# Patient Record
(demographics unavailable — no encounter records)

---

## 2024-12-02 NOTE — ASSESSMENT
[FreeTextEntry1] : We are obtaining the results of the recent MRI showing a lesion in the right breast and also the report for evaluation with our radiology staff.  The patient is also undergoing surgical evaluation with her surgeon Dr. Manley in 1 week.  Most likely the patient will undergo biopsy to assess the nature of the lesion and then she will return to discuss whether or not there is any indication for treatment depending on the pathology results.  He will return in 1 month or sooner as needed. [Curative] : Goals of care discussed with patient: Curative

## 2024-12-02 NOTE — PHYSICAL EXAM
[Restricted in physically strenuous activity but ambulatory and able to carry out work of a light or sedentary nature] : Status 1- Restricted in physically strenuous activity but ambulatory and able to carry out work of a light or sedentary nature, e.g., light house work, office work [Obese] : obese [Normal] : affect appropriate [de-identified] : no massesin breasts

## 2024-12-02 NOTE — PHYSICAL EXAM
[Restricted in physically strenuous activity but ambulatory and able to carry out work of a light or sedentary nature] : Status 1- Restricted in physically strenuous activity but ambulatory and able to carry out work of a light or sedentary nature, e.g., light house work, office work [Obese] : obese [Normal] : affect appropriate [de-identified] : no massesin breasts

## 2024-12-02 NOTE — HISTORY OF PRESENT ILLNESS
[de-identified] : This is a 74-year-old woman with history of carcinoma of breast returns for followup. The patient is doing well with no evidence of recurrent disease. She has been on Arimidex treatment and will be completing 4 years of therapy. In 2015 Pt had left lumpectomy 10 yrs ago followed by RT and Arimidex for 5 yrs. Pt has had no recurrence of her disease,  Pt had mammo 9/22 and has lesion in right breast and bx shows papillary lesion. Pt saw Dr. Manley and will have lumpectomy. [de-identified] : Pt has repeat  mammo showing lesion in breast right breast.  Currently report is not available and we will request to obtain disc and report for further evaluation.  The patient is also scheduled to see her breast surgeon Dr. Virk in 1 week for evaluation.  The patient did have a recurrence in the right breast 2 years ago with has decided not to take antihormone therapy with anastrozole and she is being monitored.  i

## 2024-12-02 NOTE — HISTORY OF PRESENT ILLNESS
[de-identified] : This is a 74-year-old woman with history of carcinoma of breast returns for followup. The patient is doing well with no evidence of recurrent disease. She has been on Arimidex treatment and will be completing 4 years of therapy. In 2015 Pt had left lumpectomy 10 yrs ago followed by RT and Arimidex for 5 yrs. Pt has had no recurrence of her disease,  Pt had mammo 9/22 and has lesion in right breast and bx shows papillary lesion. Pt saw Dr. Manley and will have lumpectomy. [de-identified] : Pt has repeat  mammo showing lesion in breast right breast.  Currently report is not available and we will request to obtain disc and report for further evaluation.  The patient is also scheduled to see her breast surgeon Dr. Virk in 1 week for evaluation.  The patient did have a recurrence in the right breast 2 years ago with has decided not to take antihormone therapy with anastrozole and she is being monitored.  i

## 2024-12-02 NOTE — REVIEW OF SYSTEMS
[Fatigue] : fatigue [Diarrhea: Grade 0] : Diarrhea: Grade 0 [Joint Pain] : joint pain [Joint Stiffness] : joint stiffness [Negative] : Allergic/Immunologic

## 2024-12-10 NOTE — HISTORY OF PRESENT ILLNESS
[de-identified] : Ms. Flor Gold is a 84 year old female who presents for a follow up visit.  Personal history of left breast lumpectomy in 2015- received RT and completed anastrozole  Family history of breast cancer in her mother at the age of 36 80??  US guided biopsy on 11/3/22 shows atypical papillary lesion BI-RADS 4  She is s/p right breast lumpectomy on 12/16/22. 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 Negative margins.  Continues to follow with Dr. Raud Smith, not on endocrine therapy at this time.   Her most recent imaging a screening MMG/US on 11/4/2024 shows right breast 9:00 irregular 0.5 cm sonographic mass, corresponding to irregular 0.8 cm mass. R USGB recommended. Left upper outer quadrant, there is nonspecific area of increased trabecular thickening, which has appeared new, L DM/US. Right upper quadrant corresponds to scar site, there is MMG focal asymmetry which appears more prominent, suggestive for postsurgical change/fat necrosis. 6-month f/u of right breast MMG/US is recommended. B4  Recall for left MMG/US on 12/9/2024 shows nonspecific trabecular thickening in the left upper outer breast which represents interval change from previous mammos. Skin thickening in the left 3:00 on US with a peu d' orange appearance noted on exam, may correspond to the skin overlying the region of mammographic concern. Possible skin biopsy is recommended. There is also an 8 mm dermal lesion in left breast 3:00 N15 which correlates to a sebaceous cyst or other dermal lesion.  B4  She is scheduled for right breast biopsy on 12/10/2024

## 2024-12-10 NOTE — ASSESSMENT
[FreeTextEntry1] : IMP: Flor is a 81 y/o female who has a history of left breast cancer.  New ~2 cm palpable finding of left breast US guided biopsy 11/2022 showed atypical palpable lesion  s/p right breast lumpectomy on 12/16/22. 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 Negative margins.  Her recent imaging recommended a right breast US (performed today 12/10/24) and a punch biopsy of left breast skin thickening with a similar appearance to peau d' orange to r/o inflammatory carcinoma.  At this time, I am not inclined to perform a punch biopsy of the left breast at 3:00. She will return in 4 months.   PLAN: Awaiting results of right breast biopsy, will speak to patient after resulted Punch biopsy not performed, return in 4 months   All medical entries were at my, Dr. Alon Manley, direction. I have reviewed the chart and agree that the record accurately reflects my personal performance of the history, physical exam, assessment and plan. Our office Nurse Practitioner was present of the duration of the office visit.

## 2024-12-10 NOTE — HISTORY OF PRESENT ILLNESS
[de-identified] : Ms. Flor Gold is a 84 year old female who presents for a follow up visit.  Personal history of left breast lumpectomy in 2015- received RT and completed anastrozole  Family history of breast cancer in her mother at the age of 36 80??  US guided biopsy on 11/3/22 shows atypical papillary lesion BI-RADS 4  She is s/p right breast lumpectomy on 12/16/22. 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 Negative margins.  Continues to follow with Dr. Radu Smith, not on endocrine therapy at this time.   Her most recent imaging a screening MMG/US on 11/4/2024 shows right breast 9:00 irregular 0.5 cm sonographic mass, corresponding to irregular 0.8 cm mass. R USGB recommended. Left upper outer quadrant, there is nonspecific area of increased trabecular thickening, which has appeared new, L DM/US. Right upper quadrant corresponds to scar site, there is MMG focal asymmetry which appears more prominent, suggestive for postsurgical change/fat necrosis. 6-month f/u of right breast MMG/US is recommended. B4  Recall for left MMG/US on 12/9/2024 shows nonspecific trabecular thickening in the left upper outer breast which represents interval change from previous mammos. Skin thickening in the left 3:00 on US with a peu d' orange appearance noted on exam, may correspond to the skin overlying the region of mammographic concern. Possible skin biopsy is recommended. There is also an 8 mm dermal lesion in left breast 3:00 N15 which correlates to a sebaceous cyst or other dermal lesion.  B4  She is scheduled for right breast biopsy on 12/10/2024

## 2024-12-10 NOTE — PHYSICAL EXAM
[Normal] : supple, no neck mass and thyroid not enlarged [Normal Neck Lymph Nodes] : normal neck lymph nodes  [Normal Supraclavicular Lymph Nodes] : normal supraclavicular lymph nodes [Normal Axillary Lymph Nodes] : normal axillary lymph nodes [Normal] : oriented to person, place and time, with appropriate affect [FreeTextEntry1] : SC present for exam  [de-identified] : Normal S1,S2. Regular rate and rhythm [de-identified] : Slight redness noted on left breast at 3:00. Right breast normal breast exam [de-identified] : Clear breath sounds bilaterally with normal respiratory effort.

## 2024-12-10 NOTE — REASON FOR VISIT
[Follow-Up Visit] : a follow-up visit for [FreeTextEntry2] : s/p right breast lumpectomy on 12/16/22

## 2024-12-10 NOTE — PHYSICAL EXAM
[Normal] : supple, no neck mass and thyroid not enlarged [Normal Neck Lymph Nodes] : normal neck lymph nodes  [Normal Supraclavicular Lymph Nodes] : normal supraclavicular lymph nodes [Normal Axillary Lymph Nodes] : normal axillary lymph nodes [Normal] : oriented to person, place and time, with appropriate affect [FreeTextEntry1] : SC present for exam  [de-identified] : Normal S1,S2. Regular rate and rhythm [de-identified] : Slight redness noted on left breast at 3:00. Right breast normal breast exam [de-identified] : Clear breath sounds bilaterally with normal respiratory effort.

## 2025-01-07 NOTE — HISTORY OF PRESENT ILLNESS
[de-identified] : Ms. Flor Gold is a 84 year old female who presents for a follow up visit.  Personal history of left breast lumpectomy in 2015- received RT and completed anastrozole  Family history of breast cancer in her mother at the age of 36 80  US guided biopsy on 11/3/22 shows atypical papillary lesion BI-RADS 4  She is s/p right breast lumpectomy on 12/16/22. 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 Negative margins.  Continues to follow with Dr. Radu Smith, not on endocrine therapy at this time.   Her most recent imaging a screening MMG/US on 11/4/2024 shows right breast 9:00 irregular 0.5 cm sonographic mass, corresponding to irregular 0.8 cm mass. R USGB recommended. Left upper outer quadrant, there is nonspecific area of increased trabecular thickening, which has appeared new, L DM/US. Right upper quadrant corresponds to scar site, there is MMG focal asymmetry which appears more prominent, suggestive for postsurgical change/fat necrosis. 6-month f/u of right breast MMG/US is recommended. B4  Recall for left MMG/US on 12/9/2024 shows nonspecific trabecular thickening in the left upper outer breast which represents interval change from previous mammos. Skin thickening in the left 3:00 on US with a peu d' orange appearance noted on exam, may correspond to the skin overlying the region of mammographic concern. Possible skin biopsy is recommended. There is also an 8 mm dermal lesion in left breast 3:00 N15 which correlates to a sebaceous cyst or other dermal lesion.  B4  R 9:00 N3 USGB on 12/10/2024: Invasive mammary carcinoma with ductal and lobular features, ER/[place]/HER2+

## 2025-01-07 NOTE — PHYSICAL EXAM
[Normal] : supple, no neck mass and thyroid not enlarged [Normal Neck Lymph Nodes] : normal neck lymph nodes  [Normal Supraclavicular Lymph Nodes] : normal supraclavicular lymph nodes [Normal Axillary Lymph Nodes] : normal axillary lymph nodes [Normal] : oriented to person, place and time, with appropriate affect [FreeTextEntry1] : SC present for exam  [de-identified] : Normal S1,S2. Regular rate and rhythm [de-identified] : Complete breast exam performed in supine and upright position. No palpable masses, tenderness, nipple discharge, inversion, deviation or enlarged axillary or supraclavicular lymph nodes bilaterally. [de-identified] : Clear breath sounds bilaterally with normal respiratory effort.

## 2025-01-07 NOTE — ASSESSMENT
[FreeTextEntry1] : IMP: Flor is a 81 y/o female who has a history of left breast cancer.  New ~2 cm palpable finding of left breast US guided biopsy 11/2022 showed atypical palpable lesion  s/p right breast lumpectomy on 12/16/22. 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 Negative margins.  Her recent imaging recommended a right breast US (performed today 12/10/24) and a punch biopsy of left breast skin thickening with a similar appearance to peau d' orange to r/o inflammatory carcinoma.  At this time, I am not inclined to perform a punch biopsy of the left breast at 3:00. She will return in 4 months.  R 9:00 N3 USGB on 12/10/2024: Invasive mammary carcinoma with ductal and lobular features, ER/[place]/HER2+  PLAN: Will discuss case with Dr. Smith and then finalize surgical plan   All medical entries were at my, Dr. Alon Manley, direction. I have reviewed the chart and agree that the record accurately reflects my personal performance of the history, physical exam, assessment and plan. Our office Nurse Practitioner was present of the duration of the office visit.

## 2025-01-07 NOTE — PHYSICAL EXAM
[Normal] : supple, no neck mass and thyroid not enlarged [Normal Neck Lymph Nodes] : normal neck lymph nodes  [Normal Supraclavicular Lymph Nodes] : normal supraclavicular lymph nodes [Normal Axillary Lymph Nodes] : normal axillary lymph nodes [Normal] : oriented to person, place and time, with appropriate affect [FreeTextEntry1] : SC present for exam  [de-identified] : Normal S1,S2. Regular rate and rhythm [de-identified] : Complete breast exam performed in supine and upright position. No palpable masses, tenderness, nipple discharge, inversion, deviation or enlarged axillary or supraclavicular lymph nodes bilaterally. [de-identified] : Clear breath sounds bilaterally with normal respiratory effort.

## 2025-01-07 NOTE — ASSESSMENT
[FreeTextEntry1] : IMP: Flor is a 81 y/o female who has a history of left breast cancer.  New ~2 cm palpable finding of left breast US guided biopsy 11/2022 showed atypical palpable lesion  s/p right breast lumpectomy on 12/16/22. 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 Negative margins.  Her recent imaging recommended a right breast US (performed today 12/10/24) and a punch biopsy of left breast skin thickening with a similar appearance to peau d' orange to r/o inflammatory carcinoma.  At this time, I am not inclined to perform a punch biopsy of the left breast at 3:00. She will return in 4 months.  R 9:00 N3 USGB on 12/10/2024: Invasive mammary carcinoma with ductal and lobular features, ER/[place]/HER2+  PLAN: Will discuss case with Dr. Smith and then finalize surgical plan   All medical entries were at my, Dr. Alon Manley, direction. I have reviewed the chart and agree that the record accurately reflects my personal performance of the history, physical exam, assessment and plan. Our office Nurse Practitioner was present of the duration of the office visit.

## 2025-01-07 NOTE — HISTORY OF PRESENT ILLNESS
[de-identified] : Ms. Flor Gold is a 84 year old female who presents for a follow up visit.  Personal history of left breast lumpectomy in 2015- received RT and completed anastrozole  Family history of breast cancer in her mother at the age of 36 80  US guided biopsy on 11/3/22 shows atypical papillary lesion BI-RADS 4  She is s/p right breast lumpectomy on 12/16/22. 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 Negative margins.  Continues to follow with Dr. Radu Smith, not on endocrine therapy at this time.   Her most recent imaging a screening MMG/US on 11/4/2024 shows right breast 9:00 irregular 0.5 cm sonographic mass, corresponding to irregular 0.8 cm mass. R USGB recommended. Left upper outer quadrant, there is nonspecific area of increased trabecular thickening, which has appeared new, L DM/US. Right upper quadrant corresponds to scar site, there is MMG focal asymmetry which appears more prominent, suggestive for postsurgical change/fat necrosis. 6-month f/u of right breast MMG/US is recommended. B4  Recall for left MMG/US on 12/9/2024 shows nonspecific trabecular thickening in the left upper outer breast which represents interval change from previous mammos. Skin thickening in the left 3:00 on US with a peu d' orange appearance noted on exam, may correspond to the skin overlying the region of mammographic concern. Possible skin biopsy is recommended. There is also an 8 mm dermal lesion in left breast 3:00 N15 which correlates to a sebaceous cyst or other dermal lesion.  B4  R 9:00 N3 USGB on 12/10/2024: Invasive mammary carcinoma with ductal and lobular features, ER/DC/HER2+

## 2025-01-22 NOTE — RESULTS/DATA
[] : results reviewed [de-identified] : Afib, 47 bpm, left anterior fascicular block, poor R-wave progression, nonspecific T-abnormality

## 2025-01-22 NOTE — DISCUSSION/SUMMARY
[FreeTextEntry1] : The patient is an 84-year-old female [place], DM, HTN, HLD with slow a-fib otherwise asymptomatic.  #1 CV- normal LV function #2 A-fib- hold Xarelto until after surgery, event monitor after surgery, discussed possible need for PPM #3 HTN- not on medication #4 HLD- c/w rosuvastatin 10mg #5 DM- c/w metformin 850 mg bid #6 Thyroid- c/w levothyroxine 75mcg, therapeutic #7 Anxiety- c/w duloxetine 60mg #8 - on Detrol LA and Oxybutynin 5mg., Son supportive.

## 2025-01-22 NOTE — RESULTS/DATA
[] : results reviewed [de-identified] : Afib, 47 bpm, left anterior fascicular block, poor R-wave progression, nonspecific T-abnormality

## 2025-01-22 NOTE — PLAN
[FreeTextEntry1] : 84 years old female medically stable for planned procedure  EKG: Afib, 47 bpm, left anterior fascicular block, poor R-wave progression, nonspecific T-abnormality referred to cardiology for medical clearance  Afib on Xarelto 20mg daily/ instructed to hold Xarelto 2-3 days prior to procedure  b/l LE edema , chronic  Venous doppler negative for DVT  hx of hypothyroidism cont Levothyroxine 75mcg daily  DM II Hemoglobin A1c 7.2 on  Metformin 850mg BID /hold metformin on the day of the procedure  HLD cont Rosuvastatin 10mg QHS  hx of depression cont Duloxetine 60mg QD  hx of overactive bladder cont Detrol 4mg QD  Labs reviewed: Elevated PTT/INR, likely from Xarelto Patient has mild anemia, stable  Patient was evaluated and cleared by cardiology.  See cardiology clearance as well.

## 2025-01-22 NOTE — HISTORY OF PRESENT ILLNESS
[Atrial Fibrillation] : atrial fibrillation [Chronic Anticoagulation] : chronic anticoagulation [Diabetes] : diabetes [(Patient denies any chest pain, claudication, dyspnea on exertion, orthopnea, palpitations or syncope)] : Patient denies any chest pain, claudication, dyspnea on exertion, orthopnea, palpitations or syncope [Coronary Artery Disease] : no coronary artery disease [Recent Myocardial Infarction] : no recent myocardial infarction [Implantable Device/Pacemaker] : no implantable device/pacemaker [Asthma] : no asthma [COPD] : no COPD [Sleep Apnea] : no sleep apnea [Smoker] : not a smoker [Family Member] : no family member with adverse anesthesia reaction/sudden death [Self] : no previous adverse anesthesia reaction [Chronic Kidney Disease] : no chronic kidney disease [FreeTextEntry1] : Right breast, lumpectomy [FreeTextEntry2] : 01/24/2025 [FreeTextEntry3] : Dr. Sindhu Chi [FreeTextEntry4] : Flor Mendez is an 84-year-old female, with hx. of A.fib, DMII, HLD, overactive bladder and hypothyroidism, Breast cancer  who presents for medical clearance prior to lumpectomy . She is accompanied by her son.   She was formerly a patient of Dr. Dunaway, whose practice recently closed.  Pt.'s son states pt has chronic b/l LE swelling/ redness . Son states swelling varies throughout the day. Last US doppler was 2 years ago, with vascular. She does not monitor glucose at home She denies CP/SOB, dizziness

## 2025-01-22 NOTE — ADDENDUM
[FreeTextEntry1] : Documented by Merly Srivastava acting as a scribe for Dr. Hood. 01/17/2025   All medical record entries made by the scribe were at my, Dr. Hood, direction and personally dictated by me on 01/17/2025. I have reviewed the chart and agree that the record accurately reflects my personal performance of the history, physical exam, assessment and plan. I have also personally directed, reviewed, and agreed with the chart.

## 2025-01-22 NOTE — PHYSICAL EXAM
[No Acute Distress] : no acute distress [Normal Sclera/Conjunctiva] : normal sclera/conjunctiva [Normal Outer Ear/Nose] : the outer ears and nose were normal in appearance [No JVD] : no jugular venous distention [No Lymphadenopathy] : no lymphadenopathy [Supple] : supple [No Respiratory Distress] : no respiratory distress  [No Accessory Muscle Use] : no accessory muscle use [Clear to Auscultation] : lungs were clear to auscultation bilaterally [Normal Rate] : normal rate  [Regular Rhythm] : with a regular rhythm [Normal S1, S2] : normal S1 and S2 [No Murmur] : no murmur heard [Pedal Pulses Present] : the pedal pulses are present [No Extremity Clubbing/Cyanosis] : no extremity clubbing/cyanosis [Soft] : abdomen soft [Non Tender] : non-tender [Non-distended] : non-distended [Normal Posterior Cervical Nodes] : no posterior cervical lymphadenopathy [Normal Anterior Cervical Nodes] : no anterior cervical lymphadenopathy [No CVA Tenderness] : no CVA  tenderness [No Spinal Tenderness] : no spinal tenderness [No Joint Swelling] : no joint swelling [No Rash] : no rash [No Focal Deficits] : no focal deficits [Normal Affect] : the affect was normal [Normal Insight/Judgement] : insight and judgment were intact [PERRL] : pupils equal round and reactive to light [EOMI] : extraocular movements intact [de-identified] : + b/l LE edema, left > rt

## 2025-01-22 NOTE — HISTORY OF PRESENT ILLNESS
[FreeTextEntry1] : Flor is an 84-year-old female DM, HLD, A-fib, hypothyroid with LE edema. She was seeing [facility] Noyola until [facility]. She has known about slow afib and been asymptomatic. Two prior lumpectomy procedures without incident. Here with her son as patient is [place].   Surgery: Right breast lumpectomy Date: 1/24/25 Surgeon: Dr. Sindhu Chi

## 2025-01-22 NOTE — HISTORY OF PRESENT ILLNESS
[FreeTextEntry1] : Flor is an 84-year-old female DM, HLD, A-fib, hypothyroid with LE edema. She was seeing [facility] Noyola until [facility]. She has known about slow afib and been asymptomatic. Two prior lumpectomy procedures without incident. Here with her son as patient is [place].   Surgery: Right breast lumpectomy Date: 1/24/25 Surgeon: Dr. Sindhu Chi

## 2025-01-22 NOTE — DISCUSSION/SUMMARY
[FreeTextEntry1] : The patient is an 84-year-old female [place], DM, HTN, HLD with slow a-fib otherwise asymptomatic.  #1 CV- normal LV function #2 A-fib- hold Xarelto until after surgery, event monitor after surgery, discussed possible need for PPM #3 HTN- not on medication #4 HLD- c/w rosuvastatin 10mg #5 DM- c/w metformin 850 mg bid #6 Thyroid- c/w levothyroxine 75mcg, therapeutic #7 Anxiety- c/w duloxetine 60mg #8 - on Detrol LA and Oxybutynin 5mg., Son supportive.

## 2025-01-22 NOTE — PHYSICAL EXAM
[No Acute Distress] : no acute distress [Normal Sclera/Conjunctiva] : normal sclera/conjunctiva [Normal Outer Ear/Nose] : the outer ears and nose were normal in appearance [No JVD] : no jugular venous distention [No Lymphadenopathy] : no lymphadenopathy [Supple] : supple [No Respiratory Distress] : no respiratory distress  [No Accessory Muscle Use] : no accessory muscle use [Clear to Auscultation] : lungs were clear to auscultation bilaterally [Normal Rate] : normal rate  [Regular Rhythm] : with a regular rhythm [Normal S1, S2] : normal S1 and S2 [No Murmur] : no murmur heard [Pedal Pulses Present] : the pedal pulses are present [No Extremity Clubbing/Cyanosis] : no extremity clubbing/cyanosis [Soft] : abdomen soft [Non Tender] : non-tender [Non-distended] : non-distended [Normal Posterior Cervical Nodes] : no posterior cervical lymphadenopathy [Normal Anterior Cervical Nodes] : no anterior cervical lymphadenopathy [No CVA Tenderness] : no CVA  tenderness [No Spinal Tenderness] : no spinal tenderness [No Joint Swelling] : no joint swelling [No Rash] : no rash [No Focal Deficits] : no focal deficits [Normal Affect] : the affect was normal [Normal Insight/Judgement] : insight and judgment were intact [PERRL] : pupils equal round and reactive to light [EOMI] : extraocular movements intact [de-identified] : + b/l LE edema, left > rt

## 2025-01-30 NOTE — PHYSICAL EXAM
[FreeTextEntry1] : SC present for exam  [de-identified] : Incision is healing well with no evidence of infection, seroma, or hematoma.

## 2025-01-30 NOTE — HISTORY OF PRESENT ILLNESS
[de-identified] : Ms. Flor Gold is a 84 year old female who presents for a post op visit.  Personal history of left breast lumpectomy in 2015- received RT and completed anastrozole  Family history of breast cancer in her mother at the age of 36 80  US guided biopsy on 11/3/22 shows atypical papillary lesion BI-RADS 4  She is s/p right breast lumpectomy on 12/16/22. 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 Negative margins.  Continues to follow with Dr. Radu Smith, not on endocrine therapy at this time.   Her most recent imaging a screening MMG/US on 11/4/2024 shows right breast 9:00 irregular 0.5 cm sonographic mass, corresponding to irregular 0.8 cm mass. R USGB recommended. Left upper outer quadrant, there is nonspecific area of increased trabecular thickening, which has appeared new, L DM/US. Right upper quadrant corresponds to scar site, there is MMG focal asymmetry which appears more prominent, suggestive for postsurgical change/fat necrosis. 6-month f/u of right breast MMG/US is recommended. B4  Recall for left MMG/US on 12/9/2024 shows nonspecific trabecular thickening in the left upper outer breast which represents interval change from previous mammos. Skin thickening in the left 3:00 on US with a peu d' orange appearance noted on exam, may correspond to the skin overlying the region of mammographic concern. Possible skin biopsy is recommended. There is also an 8 mm dermal lesion in left breast 3:00 N15 which correlates to a sebaceous cyst or other dermal lesion.  B4  R 9:00 N3 USGB on 12/10/2024: Invasive mammary carcinoma with ductal and lobular features, ER/[place]/HER2+  She is now s/p right breast lumpectomy on 1/24/2025. Path: pending *******

## 2025-01-30 NOTE — ASSESSMENT
[FreeTextEntry1] : IMP: Flor is a 83 y/o female who has a history of left breast cancer.  New ~2 cm palpable finding of left breast US guided biopsy 11/2022 showed atypical palpable lesion  s/p right breast lumpectomy on 12/16/22. 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 Negative margins.  Her recent imaging recommended a right breast US (performed today 12/10/24) and a punch biopsy of left breast skin thickening with a similar appearance to peau d' orange to r/o inflammatory carcinoma.  At this time, I am not inclined to perform a punch biopsy of the left breast at 3:00. She will return in 4 months.  R 9:00 N3 USGB on 12/10/2024: Invasive mammary carcinoma with ductal and lobular features, ER/[place]/HER2+  She is now s/p right breast lumpectomy on 1/24/2025. Path: pending *******  PLAN: Return to Dr. Smith  Return in one year w/ yearly imaging   All medical entries were at my, Dr. Alon Manley, direction. I have reviewed the chart and agree that the record accurately reflects my personal performance of the history, physical exam, assessment and plan. Our office Nurse Practitioner was present of the duration of the office visit.

## 2025-02-03 NOTE — HISTORY OF PRESENT ILLNESS
[FreeTextEntry1] : Flor had her lumpectomy 1/24/25. No events post op. No CP, palpitations, lightheadedness, dizziness or SOB. Here with her son.

## 2025-02-03 NOTE — DISCUSSION/SUMMARY
[FreeTextEntry1] : The patient is an 84-year-old female [place], DM, HTN, HLD with slow a-fib otherwise asymptomatic.  #1 CV- normal LV function #2 A-fib- c/w Xarelto 20mg, event monitor today discussed possible need for PPM #3 HTN- not on medication #4 HLD- c/w rosuvastatin 10mg #5 DM- c/w metformin 850 mg bid #6 Thyroid- c/w levothyroxine 75mcg, therapeutic #7 Anxiety- c/w duloxetine 60mg #8 - on Detrol LA and Oxybutynin 5mg., Son supportive.  [EKG obtained to assist in diagnosis and management of assessed problem(s)] : EKG obtained to assist in diagnosis and management of assessed problem(s)

## 2025-02-03 NOTE — DISCUSSION/SUMMARY
[FreeTextEntry1] : The patient is an 84-year-old female [place], DM, HTN, HLD with slow a-fib otherwise asymptomatic.  #1 CV- normal LV function #2 A-fib- c/w Xarelto 20mg, event monitor today discussed possible need for PPM #3 HTN- not on medication #4 HLD- c/w rosuvastatin 10mg #5 DM- c/w metformin 850 mg bid #6 Thyroid- c/w levothyroxine 75mcg, therapeutic #7 Anxiety- c/w duloxetine 60mg #8 - on Detrol LA and Oxybutynin 5mg., Son supportive.  [EKG obtained to assist in diagnosis and management of assessed problem(s)] : EKG obtained to assist in diagnosis and management of assessed problem(s)

## 2025-02-13 NOTE — ASSESSMENT
[FreeTextEntry1] : IMP: Flor is a 81 y/o female who has a history of left breast cancer.  New ~2 cm palpable finding of left breast US guided biopsy 11/2022 showed atypical palpable lesion  s/p right breast lumpectomy on 12/16/22. 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 Negative margins.  Her recent imaging recommended a right breast US (performed today 12/10/24) and a punch biopsy of left breast skin thickening with a similar appearance to peau d' orange to r/o inflammatory carcinoma.  At this time, I am not inclined to perform a punch biopsy of the left breast at 3:00. She will return in 4 months.  R 9:00 N3 USGB on 12/10/2024: Invasive mammary carcinoma with ductal and lobular features, ER/UT/HER2+  She is now s/p right breast lumpectomy on 1/24/2025. Path: pending *******  2/13/25: Patient presents today with complaint of a drainage from a right breast hematoma. Steris strip re-applied and compression dressing. Patient instructed to change as need.   PLAN: Advised to f/u with our office if hematoma site worsen.  Return to Dr. Smith  Return in one year w/ yearly imaging

## 2025-02-13 NOTE — HISTORY OF PRESENT ILLNESS
[de-identified] : Ms. Flor Gold is a 84 year old female who presents for a post op visit.  Personal history of left breast lumpectomy in 2015- received RT and completed anastrozole  Family history of breast cancer in her mother at the age of 36 80  US guided biopsy on 11/3/22 shows atypical papillary lesion BI-RADS 4  She is s/p right breast lumpectomy on 12/16/22. 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 Negative margins.  Continues to follow with Dr. Radu Smith, not on endocrine therapy at this time.   Her most recent imaging a screening MMG/US on 11/4/2024 shows right breast 9:00 irregular 0.5 cm sonographic mass, corresponding to irregular 0.8 cm mass. R USGB recommended. Left upper outer quadrant, there is nonspecific area of increased trabecular thickening, which has appeared new, L DM/US. Right upper quadrant corresponds to scar site, there is MMG focal asymmetry which appears more prominent, suggestive for postsurgical change/fat necrosis. 6-month f/u of right breast MMG/US is recommended. B4  Recall for left MMG/US on 12/9/2024 shows nonspecific trabecular thickening in the left upper outer breast which represents interval change from previous mammos. Skin thickening in the left 3:00 on US with a peu d' orange appearance noted on exam, may correspond to the skin overlying the region of mammographic concern. Possible skin biopsy is recommended. There is also an 8 mm dermal lesion in left breast 3:00 N15 which correlates to a sebaceous cyst or other dermal lesion.  B4  R 9:00 N3 USGB on 12/10/2024: Invasive mammary carcinoma with ductal and lobular features, ER/NC/HER2+  She is now s/p right breast lumpectomy on 1/24/2025. Path: Invasive mammary carcinoma with tubular and lobular features. LCIS. Margins are negative. ER/NC/HER2+   2/13/25: Patient presents today with complaint of a drainage from a right breast hematoma, no sign of infection. Patient denies fever of chills.

## 2025-02-13 NOTE — ASSESSMENT
[FreeTextEntry1] : The patient underwent resection of a 9 mm lesion in the right breast which is ER positive MS positive HER2 positive.  She returns to discuss management of the lesion however for the past 2 weeks she has noted bleeding from the wound and as a result she requires immediate surgical evaluation to evaluate the wound and stop the bleeding.  As a result she was sent to Dr. Naqvi's office for evaluation and treatment.  She will return in 2 weeks to discuss management of the new breast lesion. [Curative] : Goals of care discussed with patient: Curative

## 2025-02-13 NOTE — PHYSICAL EXAM
[FreeTextEntry1] : Steris strip re-applied and compression dressing. Patient instructed to change as need.   [de-identified] : right breast drainage from hematoma s/p lumpectomy.  no evidence of infection.

## 2025-02-18 NOTE — HISTORY OF PRESENT ILLNESS
[de-identified] : Ms. Flor Gold is a 84 year old female who presents for a post op visit.  Personal history of left breast lumpectomy in 2015- received RT and completed anastrozole  Family history of breast cancer in her mother at the age of 36 80  US guided biopsy on 11/3/22 shows atypical papillary lesion BI-RADS 4  She is s/p right breast lumpectomy on 12/16/22. 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 Negative margins.  Continues to follow with Dr. Radu Smith, not on endocrine therapy at this time.   Her most recent imaging a screening MMG/US on 11/4/2024 shows right breast 9:00 irregular 0.5 cm sonographic mass, corresponding to irregular 0.8 cm mass. R USGB recommended. Left upper outer quadrant, there is nonspecific area of increased trabecular thickening, which has appeared new, L DM/US. Right upper quadrant corresponds to scar site, there is MMG focal asymmetry which appears more prominent, suggestive for postsurgical change/fat necrosis. 6-month f/u of right breast MMG/US is recommended. B4  Recall for left MMG/US on 12/9/2024 shows nonspecific trabecular thickening in the left upper outer breast which represents interval change from previous mammos. Skin thickening in the left 3:00 on US with a peu d' orange appearance noted on exam, may correspond to the skin overlying the region of mammographic concern. Possible skin biopsy is recommended. There is also an 8 mm dermal lesion in left breast 3:00 N15 which correlates to a sebaceous cyst or other dermal lesion.  B4  R 9:00 N3 USGB on 12/10/2024: Invasive mammary carcinoma with ductal and lobular features, ER/MS/HER2+  She is now s/p right breast lumpectomy on 1/24/2025. Path: Invasive mammary carcinoma with tubular and lobular features. LCIS. Margins are negative. ER/MS/HER2+   2/18/2025: Wound check, continues on xarelto. Notice that the drainage/bleeding has slowly decreased overtime.

## 2025-02-18 NOTE — ASSESSMENT
[FreeTextEntry1] : IMP: Flor is a 83 y/o female who has a history of left breast cancer.  New ~2 cm palpable finding of left breast US guided biopsy 11/2022 showed atypical palpable lesion  s/p right breast lumpectomy on 12/16/22. 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 Negative margins.  Her recent imaging recommended a right breast US (performed today 12/10/24) and a punch biopsy of left breast skin thickening with a similar appearance to peau d' orange to r/o inflammatory carcinoma.  At this time, I am not inclined to perform a punch biopsy of the left breast at 3:00. She will return in 4 months.  R 9:00 N3 USGB on 12/10/2024: Invasive mammary carcinoma with ductal and lobular features, ER/MI/HER2+  She is now s/p right breast lumpectomy on 1/24/2025. Path: Invasive mammary carcinoma with tubular and lobular features. LCIS. Margins are negative. ER/MI/HER2+   Hematoma draining, redressed in office. As per son, pt still on Xarelto but bleeding has continued to decrease.  PLAN: Will speak with patient in one week to ensure that the drainage is minimal.  Return to Dr. Smith after healed    All medical entries were at my, Dr. Alon Manley, direction. I have reviewed the chart and agree that the record accurately reflects my personal performance of the history, physical exam, assessment and plan. Our office Nurse Practitioner was present of the duration of the office visit.

## 2025-02-18 NOTE — PHYSICAL EXAM
[FreeTextEntry1] : SC present for exam  [de-identified] : Incision is healing well, small drainage.

## 2025-02-18 NOTE — HISTORY OF PRESENT ILLNESS
[de-identified] : Ms. Flor Gold is a 84 year old female who presents for a post op visit.  Personal history of left breast lumpectomy in 2015- received RT and completed anastrozole  Family history of breast cancer in her mother at the age of 36 80  US guided biopsy on 11/3/22 shows atypical papillary lesion BI-RADS 4  She is s/p right breast lumpectomy on 12/16/22. 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 Negative margins.  Continues to follow with Dr. Radu Smith, not on endocrine therapy at this time.   Her most recent imaging a screening MMG/US on 11/4/2024 shows right breast 9:00 irregular 0.5 cm sonographic mass, corresponding to irregular 0.8 cm mass. R USGB recommended. Left upper outer quadrant, there is nonspecific area of increased trabecular thickening, which has appeared new, L DM/US. Right upper quadrant corresponds to scar site, there is MMG focal asymmetry which appears more prominent, suggestive for postsurgical change/fat necrosis. 6-month f/u of right breast MMG/US is recommended. B4  Recall for left MMG/US on 12/9/2024 shows nonspecific trabecular thickening in the left upper outer breast which represents interval change from previous mammos. Skin thickening in the left 3:00 on US with a peu d' orange appearance noted on exam, may correspond to the skin overlying the region of mammographic concern. Possible skin biopsy is recommended. There is also an 8 mm dermal lesion in left breast 3:00 N15 which correlates to a sebaceous cyst or other dermal lesion.  B4  R 9:00 N3 USGB on 12/10/2024: Invasive mammary carcinoma with ductal and lobular features, ER/NC/HER2+  She is now s/p right breast lumpectomy on 1/24/2025. Path: Invasive mammary carcinoma with tubular and lobular features. LCIS. Margins are negative. ER/NC/HER2+   2/18/2025: Wound check, continues on xarelto. Notice that the drainage/bleeding has slowly decreased overtime.

## 2025-02-18 NOTE — ASSESSMENT
[FreeTextEntry1] : IMP: Flor is a 81 y/o female who has a history of left breast cancer.  New ~2 cm palpable finding of left breast US guided biopsy 11/2022 showed atypical palpable lesion  s/p right breast lumpectomy on 12/16/22. 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 Negative margins.  Her recent imaging recommended a right breast US (performed today 12/10/24) and a punch biopsy of left breast skin thickening with a similar appearance to peau d' orange to r/o inflammatory carcinoma.  At this time, I am not inclined to perform a punch biopsy of the left breast at 3:00. She will return in 4 months.  R 9:00 N3 USGB on 12/10/2024: Invasive mammary carcinoma with ductal and lobular features, ER/CA/HER2+  She is now s/p right breast lumpectomy on 1/24/2025. Path: Invasive mammary carcinoma with tubular and lobular features. LCIS. Margins are negative. ER/CA/HER2+   Hematoma draining, redressed in office. As per son, pt still on Xarelto but bleeding has continued to decrease.  PLAN: Will speak with patient in one week to ensure that the drainage is minimal.  Return to Dr. Smith after healed    All medical entries were at my, Dr. Alon Manley, direction. I have reviewed the chart and agree that the record accurately reflects my personal performance of the history, physical exam, assessment and plan. Our office Nurse Practitioner was present of the duration of the office visit.

## 2025-02-18 NOTE — PHYSICAL EXAM
[FreeTextEntry1] : SC present for exam  [de-identified] : Incision is healing well, small drainage.

## 2025-02-25 NOTE — REASON FOR VISIT
[Family Member] : family member [Home] : at home, [unfilled] , at the time of the visit. [Medical Office: ([facility])___] : at the medical office located in  [Telehealth (audio & video)] : This visit was provided via telehealth using real-time 2-way audio visual technology. [Verbal consent obtained from patient] : the patient, [unfilled] [Follow-Up Visit] : a follow-up [FreeTextEntry2] : breast ca

## 2025-02-25 NOTE — REVIEW OF SYSTEMS
[Fatigue] : fatigue [Shortness Of Breath] : shortness of breath [Diarrhea: Grade 0] : Diarrhea: Grade 0 [Negative] : Allergic/Immunologic [FreeTextEntry5] : has afib

## 2025-02-25 NOTE — ASSESSMENT
[FreeTextEntry1] : A discussion took place with the patient and her daughter on telehealth conference.  The patient has developed a new mass in the right breast which is her third breast carcinoma initial 1 in 2015 was in the left breast, 2022 in the right breast and now another lesion in the right breast.  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  The Ki-67 was 25% and the tumor is 9 mm.  The staging is T1b NX the ER strongly +80% AL +80% HER2 2+ and amplified by [facility] .  There was no lymphovascular invasion noted.  The patient looks and feels well but continues to be followed for bleeding from the wound which is diminishing in amount.  We discussed options for treatment and recommended she was a candidate for hormonal therapy.  Previously she had received anastrozole in the past.  Now we will recommend Aromasin 25 mg daily.  We discussed side effects including fatigue, hot flashes, joint aches, decreased bone density and elevated cholesterol.  Also the the tumor was HER2 positive on FISH staining however because of her history of cardiac arrhythmias with A-fib on anticoagulant therapy and overall poor performance status we would recommend holding the HER2 therapy and monitoring her for recurrence.  Since his tumor is small with no other aggressive features we will hold radiation therapy at this time and this was discussed.  The patient will return to the office in 3 months or sooner as needed. [Curative] : Goals of care discussed with patient: Curative

## 2025-02-25 NOTE — HISTORY OF PRESENT ILLNESS
[de-identified] : This is a 74-year-old woman with history of carcinoma of breast returns for followup. The patient is doing well with no evidence of recurrent disease. She has been on Arimidex treatment and will be completing 4 years of therapy. In 2015 Pt had left lumpectomy 10 yrs ago followed by RT and Arimidex for 5 yrs. Pt has had no recurrence of her disease,  Pt had mammo 9/22 and has lesion in right breast and bx shows papillary lesion. Pt saw Dr. Manley and will have lumpectomy.  The patient has developed an new lesion in the right breast in December 2024 and underwent surgical consultation.  He underwent biopsy and returns today to discuss results.  For the past week however the patient has had bleeding from the wound and comes in for immediate evaluation to stop the bleeding from the breast wound. [de-identified] : The patient is undergoing a telehealth visit to discuss further management of her right breast carcinoma diagnosed in January 2025.  She was seen 2 weeks ago and had excessive bleeding from the right breast requiring urgent surgical visit.  Since then the bleeding has been subsiding and is now minimal.

## 2025-03-04 NOTE — PLAN
[FreeTextEntry1] : Annual Wellness   Health Maintenance  Referred for bone density   All meds renewed.   b/l LE edema, chronic start compression socks  Venous doppler negative for DVT Referred to Vascular  Cellulitis  start doxycycline 100mg BID    b/l cerumen impaction Referred to ENT  Dizziness  cont. Meclizine HCl 25mg daily PRN   Breast cancer s/p lumpectomy  follows with oncology   Afib on Xarelto 20mg daily follow with cardiology   hx of Depression cont Duloxetine 60mg QD  hx of hypothyroidism cont Levothyroxine 75mcg daily we'll check TSH/T4 today  DM II cont. Metformin 850mg BID  Reinforced the importance of following a low carb/low sugar diet. We'll check glucose and HbA1c today  HLD cont Rosuvastatin 10mg QHS Reinforced the importance of following a low cholesterol/low fat diet. we'll check lipids and LFT's today  hx of overactive bladder cont oxybutynin chloride 5mg QD  Bloodwork ordered.  Follow up in one week for lab results.

## 2025-03-04 NOTE — PHYSICAL EXAM
[No Acute Distress] : no acute distress [Well Nourished] : well nourished [Well Developed] : well developed [Well-Appearing] : well-appearing [Normal Sclera/Conjunctiva] : normal sclera/conjunctiva [PERRL] : pupils equal round and reactive to light [EOMI] : extraocular movements intact [Normal Outer Ear/Nose] : the outer ears and nose were normal in appearance [Normal Oropharynx] : the oropharynx was normal [No JVD] : no jugular venous distention [No Lymphadenopathy] : no lymphadenopathy [Supple] : supple [Thyroid Normal, No Nodules] : the thyroid was normal and there were no nodules present [No Respiratory Distress] : no respiratory distress  [No Accessory Muscle Use] : no accessory muscle use [Clear to Auscultation] : lungs were clear to auscultation bilaterally [Normal Rate] : normal rate  [Regular Rhythm] : with a regular rhythm [Normal S1, S2] : normal S1 and S2 [No Murmur] : no murmur heard [No Carotid Bruits] : no carotid bruits [No Abdominal Bruit] : a ~M bruit was not heard ~T in the abdomen [No Varicosities] : no varicosities [Pedal Pulses Present] : the pedal pulses are present [No Palpable Aorta] : no palpable aorta [No Extremity Clubbing/Cyanosis] : no extremity clubbing/cyanosis [Soft] : abdomen soft [Non Tender] : non-tender [Non-distended] : non-distended [No Masses] : no abdominal mass palpated [No HSM] : no HSM [Normal Bowel Sounds] : normal bowel sounds [Normal Posterior Cervical Nodes] : no posterior cervical lymphadenopathy [Normal Anterior Cervical Nodes] : no anterior cervical lymphadenopathy [No CVA Tenderness] : no CVA  tenderness [No Spinal Tenderness] : no spinal tenderness [No Joint Swelling] : no joint swelling [Grossly Normal Strength/Tone] : grossly normal strength/tone [Coordination Grossly Intact] : coordination grossly intact [No Focal Deficits] : no focal deficits [Normal Gait] : normal gait [Deep Tendon Reflexes (DTR)] : deep tendon reflexes were 2+ and symmetric [Normal Affect] : the affect was normal [Normal Insight/Judgement] : insight and judgment were intact [de-identified] : + b/l cerumen impaction  [de-identified] : + b/l LE edema  [de-identified] : + redness LE

## 2025-03-04 NOTE — HISTORY OF PRESENT ILLNESS
[de-identified] : Ms. JOVI BUCKLEY is a 84 year old female with Hx of A.fib, DMII, HLD, overactive bladder and hypothyroidism, Breast cancer s/p lumpectomy 1/24/25, presenting for an Annual Wellness Visit. She is accompanied by her son.    Pt is s/p lumpectomy (not on chemotherapy), following with oncology, has tolerated well and is feeling well today. Her son reports she has had discharge from surgical wound d/t Xarelto; he states compression of wound has improved/lessened discharge.  Pt has been on duloxetine for years for depression (since  passed). Pt's son reports Pt experiences vertigo d/t duloxetine and reports she is taking meclizine daily.  Pt states she was sick end of December and son states she still has cough, wheezing, and intermittent congestion. Pt was hospitalized 12/19/24 for rhinovirus and had a CXR which revealed Pulmonary vascular congestion and trace left effusion s/p Ceftriaxone and Azithromycin in the ED. US doppler B/L LE 1/17/25 revealed no evidence of DVT bilaterally and Left baker's cyst measuring 5cm. Son reports Pt is using cream on her swollen legs and massaging. She has not tried compression socks yet.  Denies any SOB, CP, abdominal pain, V/D.  Reports compliance with taking her meds daily. Niacinamide Counseling: I recommended taking niacin or niacinamide, also know as vitamin B3, twice daily. Recent evidence suggests that taking vitamin B3 (500 mg twice daily) can reduce the risk of actinic keratoses and non-melanoma skin cancers. Side effects of vitamin B3 include flushing and headache.

## 2025-03-04 NOTE — HEALTH RISK ASSESSMENT
[Good] : ~his/her~ current health as good [Fair] :  ~his/her~ mood as fair [One fall no injury in past year] : Patient reported one fall in the past year without injury [No] : In the past 12 months have you used drugs other than those required for medical reasons? No [Little interest or pleasure doing things] : 1) Little interest or pleasure doing things [0] : 1) Little interest or pleasure doing things: Not at all (0) [Feeling down, depressed, or hopeless] : 2) Feeling down, depressed, or hopeless [1] : 2) Feeling down, depressed, or hopeless for several days (1) [PHQ-2 Negative - No further assessment needed] : PHQ-2 Negative - No further assessment needed [Former] : Former [5-9] : 5-9 [> 15 Years] : > 15 Years [NO] : No [Patient reported mammogram was abnormal] : Patient reported mammogram was abnormal [Patient reported bone density results were normal] : Patient reported bone density results were normal [Patient reported colonoscopy was normal] : Patient reported colonoscopy was normal [With Family] : lives with family [# of Members in Household ___] :  household currently consist of [unfilled] member(s) [Retired] : retired [High School] : high school [] :  [# Of Children ___] : has [unfilled] children [Feels Safe at Home] : Feels safe at home [Fully functional (bathing, dressing, toileting, transferring, walking, feeding)] : Fully functional (bathing, dressing, toileting, transferring, walking, feeding) [Independent] : using telephone [Some assistance needed] : managing medications [Full assistance needed] : managing finances [Reports changes in hearing] : Reports changes in hearing [Reports normal functional visual acuity (ie: able to read med bottle)] : Reports normal functional visual acuity [Smoke Detector] : smoke detector [Carbon Monoxide Detector] : carbon monoxide detector [Seat Belt] :  uses seat belt [de-identified] : Admits she is not active. [de-identified] : Admits her diet could be better.  [[id number]] : 1 [de-identified] : Quit 40 years ago.  [Reports changes in vision] : Reports no changes in vision [Reports changes in dental health] : Reports no changes in dental health [Sunscreen] : does not use sunscreen [Travel to Developing Areas] : does not  travel to developing areas [TB Exposure] : is not being exposed to tuberculosis [Caregiver Concerns] : does not have caregiver concerns [MammogramDate] : 12/2024 [MammogramComments] : Right breast lumpectomy in 01/2025. [PapSmearDate] : n/a [BoneDensityDate] : 10 years ago [ColonoscopyDate] : Over 20 years ago [FreeTextEntry2] : Family [FreeTextEntry3] : Family [FreeTextEntry5] : Family [FreeTextEntry6] : Family [FreeTextEntry7] : Sister [FreeTextEntry8] : Family [de-identified] : Wears bilateral hearing aids.  [de-identified] : Right eye cataract. Follows with an ophthalmologist. Wears progressive lenses.  [de-identified] : Wears upper dentures. Hasn't seen a dentist in about two years.  [de-identified] : Avoids the sun.  [FreeTextEntry4] : Doesn't have a proxy.

## 2025-03-04 NOTE — ADDENDUM
[FreeTextEntry1] : Documented by Casandra Corneilus acting as a scribe for Dr. Hood. 03/04/2025   All medical record entries made by the scribe were at my, Dr. Hood, direction and personally dictated by me on 03/04/2025. I have reviewed the chart an agree that the record accurately reflects my personal performance of the history, physical exam, assessment and plan. I have also personally directed, reviewed, and agreed with the chart.

## 2025-03-18 NOTE — DISCUSSION/SUMMARY
[Medication Risks Reviewed] : Medication risks reviewed [Surgical risks reviewed] : Surgical risks reviewed [PRN] : PRN [de-identified] : Son stated on the behalf of his mother that they have a vascular consult in the near future. Discussion of injections such as cortisone or gel injections to alleviate some of the knee pain that she is intermittently experiencing, will not be administered till the cellulitis is completely resolved.  I, Dr. Radu Gottlieb, personally performed the evaluation and management (E/M) services for this new patient.  That E/M includes conducting the initial examination, assessing all conditions, and establishing a plan of care.  Today, my ACP, Mary Blair, was here to observe my evaluation and management services for this patient to be followed going forward.

## 2025-03-18 NOTE — HISTORY OF PRESENT ILLNESS
[Stable] : stable [0] : a current pain level of 0/10 [Intermit.] : ~He/She~ states the symptoms seem to be intermittent [Rest] : relieved by rest [de-identified] : Patient is an 84-year-old female, accompanied by her son, with complaints of right knee pain upon getting up from a seated position but once she advances with her activity the pain subsides.  Patient also has a past surgical history of the left knee with a cyst formation that needed to be frequently aspirated and then surgically removed on the anterior aspect of the left knee. Patient is worried that the same thing may be occurring with the right knee since she had a sonogram to rule out deep vein thrombosis a comment was made on the summary of the report stating a cyst in the popliteal region of the right knee. [de-identified] : Getting up from a seated position

## 2025-03-18 NOTE — HISTORY OF PRESENT ILLNESS
[Stable] : stable [0] : a current pain level of 0/10 [Intermit.] : ~He/She~ states the symptoms seem to be intermittent [Rest] : relieved by rest [de-identified] : Patient is an 84-year-old female, accompanied by her son, with complaints of right knee pain upon getting up from a seated position but once she advances with her activity the pain subsides.  Patient also has a past surgical history of the left knee with a cyst formation that needed to be frequently aspirated and then surgically removed on the anterior aspect of the left knee. Patient is worried that the same thing may be occurring with the right knee since she had a sonogram to rule out deep vein thrombosis a comment was made on the summary of the report stating a cyst in the popliteal region of the right knee. [de-identified] : Getting up from a seated position

## 2025-03-18 NOTE — DISCUSSION/SUMMARY
[Medication Risks Reviewed] : Medication risks reviewed [Surgical risks reviewed] : Surgical risks reviewed [PRN] : PRN [de-identified] : Son stated on the behalf of his mother that they have a vascular consult in the near future. Discussion of injections such as cortisone or gel injections to alleviate some of the knee pain that she is intermittently experiencing, will not be administered till the cellulitis is completely resolved.  I, Dr. Radu Gottlieb, personally performed the evaluation and management (E/M) services for this new patient.  That E/M includes conducting the initial examination, assessing all conditions, and establishing a plan of care.  Today, my ACP, Mary Blair, was here to observe my evaluation and management services for this patient to be followed going forward.

## 2025-03-18 NOTE — PHYSICAL EXAM
[Normal] : Oriented to person, place, and time, insight and judgement were intact and the affect was normal [Antalgic] : antalgic [Wide-Based] : wide-based [LE] : Sensory: Intact in bilateral lower extremities [de-identified] : Active range of motion of the right knee with full extension and flexion to 105 degrees [de-identified] : Hyperpigmentation and cellulitic bilateral lower extremities. Vascular issues are prominent to bilateral lower extremities. Open wound is noted on the right lateral calf. [de-identified] : Pitting edema to bilateral lower extremities  [de-identified] : Standing x-rays 3 views demonstrating grade 4 severe tricompartmental degenerative joint disease of the right knee with a varus malalignment on AP view.  Periarticular osteophytes are seen on the medial and lateral compartments of the femoral condyles and tibial plateaus.  Decreased space of the medial compartment, with bone-on-bone contact.  Subchondral sclerotic surfaces of the medial compartment.  Lateral view of the knee demonstrating malalignment of the medial compartments.  Large osteophytes off of the patella.  Loose bodies seen in the posterior aspect of the knee compartment.  Bone-on-bone contact of the patella to the femoral groove, demonstrating the patellofemoral joint spaces of DJD.  [place] view of the right knee is demonstrating lateralization of the patella to the femoral groove and large osteophytes also exhibit from the femoral condyles and the patella.  No fractures.

## 2025-03-18 NOTE — PHYSICAL EXAM
[Normal] : Oriented to person, place, and time, insight and judgement were intact and the affect was normal [Antalgic] : antalgic [Wide-Based] : wide-based [LE] : Sensory: Intact in bilateral lower extremities [de-identified] : Active range of motion of the right knee with full extension and flexion to 105 degrees [de-identified] : Hyperpigmentation and cellulitic bilateral lower extremities. Vascular issues are prominent to bilateral lower extremities. Open wound is noted on the right lateral calf. [de-identified] : Pitting edema to bilateral lower extremities  [de-identified] : Standing x-rays 3 views demonstrating grade 4 severe tricompartmental degenerative joint disease of the right knee with a varus malalignment on AP view.  Periarticular osteophytes are seen on the medial and lateral compartments of the femoral condyles and tibial plateaus.  Decreased space of the medial compartment, with bone-on-bone contact.  Subchondral sclerotic surfaces of the medial compartment.  Lateral view of the knee demonstrating malalignment of the medial compartments.  Large osteophytes off of the patella.  Loose bodies seen in the posterior aspect of the knee compartment.  Bone-on-bone contact of the patella to the femoral groove, demonstrating the patellofemoral joint spaces of DJD.  [place] view of the right knee is demonstrating lateralization of the patella to the femoral groove and large osteophytes also exhibit from the femoral condyles and the patella.  No fractures.

## 2025-03-31 NOTE — PHYSICAL EXAM
[FreeTextEntry1] : legs still edematous, stemmers sign, dry skin, lipodermatosclerosis and hyperpigmenation

## 2025-03-31 NOTE — PHYSICAL EXAM
[FreeTextEntry1] : legs still edematous, stemmers sign, dry skin, lipodermatosclerosis and hyperpigmenation  Detail Level: Simple Additional Notes: Patient consent was obtained to proceed with the visit and recommended plan of care after discussion of all risks and benefits, including the risks of COVID-19 exposure.

## 2025-03-31 NOTE — ASSESSMENT
[FreeTextEntry1] : Assessment: 1.  Lymphedema 2.  Prior cellulitis 3.  Decreased mobility  4.  Sleeps in recliner  Plan 1.  Continue compression garments daily  put on in AM and take off in PM 2.  Ammonium lactate cream BID RX sent in  3.  Lymphedema secondary to obesity, prior cellulitis,. Despite efforts of 20-30mmHg compression, elevation and exercise of over 4 weeks since  1/1/2023 symptoms persist and early signs of hyperpigmentation are noted.  Will arrange for a pneumatic pump 4.  She should walk more, elevate legs and sleep in a bed, not  in a recliner 5.  If she gets the pump, she should use it daily for an hour at a time 6.  RTC in October

## 2025-03-31 NOTE — REASON FOR VISIT
[FreeTextEntry2] : vascular evaluation [FreeTextEntry1] : 3/31/2025  84-year-old [place] Female with PMHX DM, HTN, HLD with slow A-fib, LE edema, who presents for evaluation.  Bilateral lymphedema sleeps in a recliner wearing compression  legs still edematous, stemmers sign, dry skin, lipodermatosclerosis and hyperpigmenation  LE venous duplex 1/2025: No DVT  TTE 12/2024 1. Technically difficult image quality.  2. Suboptimal endocardial resoluation. Study quality precludes estimation of ejection fraction or assessment of regional wall motion. Systolic function is probably normal.  3. The right ventricle is not well visualized.  LE venous duplex 2012: No DVT. Venous insufficiency noted.

## 2025-03-31 NOTE — REVIEW OF SYSTEMS
Discharge Planner Post-Acute Rehab SLP:     Discharge Plan: home Independently. Ongoing SLP    Precautions: Swallowing, falls, alarms    Current Status:  Hearing: Mild hard of hearing. Uses aids but not available  Vision: glasses - low vision   Communication: Mild-moderate dysarthria  Cognition: Mild cognitive impairments with deficits re: processing, minimal working memory, higher level executive skills  Swallow: Easy to chew (7), slightly thick (1) liquid. Chin tuck    Assessment:  Pt seen with meal easy chew (7) texture, slightly thick (1) liquid by cup + chin tuck. Pt with prolonged but functional mastication, timely APtransit, no oral residuals. Ongoing excellent, consistent, and IND implementation of chin tuck with all liquids across meal. No s/sx aspiration or fatigue present. Current diet remains appropriate. Will conduct trials of advanced texture next session.    Other Barriers to Discharge (Family Training, etc): Education for diet modifications as needed       [Lower Ext Edema] : lower extremity edema [Negative] : Heme/Lymph

## 2025-04-29 NOTE — PHYSICAL EXAM
[No Acute Distress] : no acute distress [Well Nourished] : well nourished [Well Developed] : well developed [Well-Appearing] : well-appearing [Normal Sclera/Conjunctiva] : normal sclera/conjunctiva [Normal Outer Ear/Nose] : the outer ears and nose were normal in appearance [Normal Oropharynx] : the oropharynx was normal [Normal TMs] : both tympanic membranes were normal [No JVD] : no jugular venous distention [No Lymphadenopathy] : no lymphadenopathy [Supple] : supple [No Respiratory Distress] : no respiratory distress  [No Accessory Muscle Use] : no accessory muscle use [Normal Rate] : normal rate  [Regular Rhythm] : with a regular rhythm [Normal S1, S2] : normal S1 and S2 [Pedal Pulses Present] : the pedal pulses are present [No Extremity Clubbing/Cyanosis] : no extremity clubbing/cyanosis [Soft] : abdomen soft [Non Tender] : non-tender [Non-distended] : non-distended [No Masses] : no abdominal mass palpated [Normal Bowel Sounds] : normal bowel sounds [Normal Posterior Cervical Nodes] : no posterior cervical lymphadenopathy [Normal Anterior Cervical Nodes] : no anterior cervical lymphadenopathy [No CVA Tenderness] : no CVA  tenderness [No Spinal Tenderness] : no spinal tenderness [No Joint Swelling] : no joint swelling [Grossly Normal Strength/Tone] : grossly normal strength/tone [No Rash] : no rash [Coordination Grossly Intact] : coordination grossly intact [No Focal Deficits] : no focal deficits [Normal Gait] : normal gait [Normal Affect] : the affect was normal [Normal Insight/Judgement] : insight and judgment were intact [EOMI] : extraocular movements intact [de-identified] : + hoarseness of voice  [de-identified] :  + diminished lung sounds b/l / + occ whhezing  [de-identified] : + b/l edema/ ? lymphedema [de-identified] :  + b/l LE erythema

## 2025-04-29 NOTE — ADDENDUM
[FreeTextEntry1] : Documented by Casandra Cornelius acting as a scribe for Dr. Hood. 04/29/2025   All medical record entries made by the scribe were at my, Dr. Hood, direction and personally dictated by me on 04/29/2025. I have reviewed the chart an agree that the record accurately reflects my personal performance of the history, physical exam, assessment and plan. I have also personally directed, reviewed, and agreed with the chart.

## 2025-04-29 NOTE — HISTORY OF PRESENT ILLNESS
[de-identified] : Ms. JOVI BUCKLEY is an 85 year old female with Hx of A.fib, DMII, HLD, overactive bladder and hypothyroidism, chronic b/l LE edema, Breast cancer s/p lumpectomy 1/24/25, presenting for a follow up on chronic problems. She is accompanied by her son in law and ambulating with a walker.    Pt c/o weakness in both legs. She has been using compression socks BID and is following with vascular for b/l edema. Pt c/o wheezing and BARNES walking short distances <10ft. Pt reports productive cough in evenings with white and yellow phlegm.   She has been using Trelegy inhaler with some improvement. Her son in law reports Pt sleeps in recliner.   March labs revealed microscopic hematuria and anemia, and Pt has not yet followed up with urology or GI.  Denies any CP, abdominal pain.

## 2025-04-29 NOTE — REVIEW OF SYSTEMS
[Lower Ext Edema] : lower extremity edema [Wheezing] : wheezing [Cough] : cough [Dyspnea on Exertion] : dyspnea on exertion [Muscle Weakness] : muscle weakness [Negative] : Heme/Lymph [Chest Pain] : no chest pain [Palpitations] : no palpitations

## 2025-04-29 NOTE — PLAN
[FreeTextEntry1] : Follow up   Asthmatic bronchitis / BARNES  Chest Xray r/o pneumonia  start Augmentin 875-125mg BID with food  cont. Trelegy inhaler 100-62.5 mcg -- renewed today Referred to Pulmonology Referred to cardiology  Pt to follow up in one week or sooner if Sx persist or worsen.  Microscopic hematuria  US Kidney and Bladder  Urology F/u   b/l LE edema, chronic start compression socks  Venous doppler negative for DVT follows with vascular  Breast cancer s/p lumpectomy  follows with oncology   Afib on Xarelto 20mg daily follow with cardiology   hx of Depression cont Duloxetine 60mg QD  hx of hypothyroidism cont Levothyroxine 75mcg daily we'll monitor TSH/T4   DM II cont. Metformin 850mg BID  Reinforced the importance of following a low carb/low sugar diet. We'll monitor glucose and HbA1c   HLD cont Rosuvastatin 10mg QHS Reinforced the importance of following a low cholesterol/low fat diet. we'll monitor lipids and LFT's   hx of overactive bladder cont oxybutynin chloride 5mg QD

## 2025-05-12 NOTE — HISTORY OF PRESENT ILLNESS
[Former] : former [TextBox_4] : 86 yo F with PMH of Anemia, Asthma, AFIB, Breast CA recently diagnosed, Hypothyroidism, HLD, Obesity, DM.

## 2025-05-12 NOTE — ASSESSMENT
[FreeTextEntry1] : 86 yo F with PMH of Anemia, Asthma, AFIB, Breast CA, Hypothyroidism, HLD, Obesity, DM  Presents to [facility] care  Was hospitalized in Dec 2024/Jan 2025, discharged at that time on Trelegy and found to have breast cancer during that hospitalization. Reports since then having intermittent cough, sob, anxiety. Medication adjustments made by Oncology which has helped anxiety/tremor. She was treated by PCP for bronchitis 1 month ago. Given Abx and CXR done showing vascular congestion.  Since treatment completed for Bronchitis, reports overall feeling much improved. has some sob with activity still but not as before.  non-smoker    On exam, lungs are clear, no wheezing or rhonchi, in NAD. Obese. Mallamapti 3.   Care plans discussed - recommend she continue Trelegy (100mcg/62.5mcg/25 mcg) 1 puff Daily for management of SOB at this time, unable to complete PFT, likely aspect of restrictive disease present. Follow-up in 1 month.

## 2025-06-13 NOTE — DISCUSSION/SUMMARY
[FreeTextEntry1] : The patient is an 85-year-old female [place], DM, HTN, HLD with slow a-fib otherwise asymptomatic.  #1 CV- normal LV function #2 A-fib- c/w Xarelto 20mg, event monitor  average 64 #3 HTN- not on medication #4 HLD- c/w rosuvastatin 10mg #5 DM- c/w metformin 850 mg bid #6 Thyroid- c/w levothyroxine 75mcg, therapeutic #7 Anxiety- c/w duloxetine 60mg #8 - on Detrol LA and Oxybutynin 5mg., Son supportive.

## 2025-06-13 NOTE — DISCUSSION/SUMMARY
[FreeTextEntry1] : The patient is an 85-year-old female [place], DM, HTN, HLD with slow a-fib otherwise asymptomatic.  #1 CV- normal LV function #2 A-fib- c/w Xarelto 20mg, event monitor  average 64 #3 HTN- not on medication #4 HLD- c/w rosuvastatin 10mg #5 DM- c/w metformin 850 mg bid #6 Thyroid- c/w levothyroxine 75mcg, therapeutic #7 Anxiety- c/w duloxetine 60mg #8 - on Detrol LA and Oxybutynin 5mg., Son supportive.

## 2025-06-15 NOTE — PHYSICAL EXAM
[No Acute Distress] : no acute distress [Well Nourished] : well nourished [Well Developed] : well developed [Well-Appearing] : well-appearing [Normal Sclera/Conjunctiva] : normal sclera/conjunctiva [Normal Outer Ear/Nose] : the outer ears and nose were normal in appearance [Normal Oropharynx] : the oropharynx was normal [No JVD] : no jugular venous distention [No Lymphadenopathy] : no lymphadenopathy [Supple] : supple [No Respiratory Distress] : no respiratory distress  [No Accessory Muscle Use] : no accessory muscle use [Clear to Auscultation] : lungs were clear to auscultation bilaterally [Normal Rate] : normal rate  [Regular Rhythm] : with a regular rhythm [Normal S1, S2] : normal S1 and S2 [Pedal Pulses Present] : the pedal pulses are present [No Extremity Clubbing/Cyanosis] : no extremity clubbing/cyanosis [Soft] : abdomen soft [Non Tender] : non-tender [Non-distended] : non-distended [Normal Posterior Cervical Nodes] : no posterior cervical lymphadenopathy [Normal Anterior Cervical Nodes] : no anterior cervical lymphadenopathy [No CVA Tenderness] : no CVA  tenderness [No Spinal Tenderness] : no spinal tenderness [No Joint Swelling] : no joint swelling [Grossly Normal Strength/Tone] : grossly normal strength/tone [No Rash] : no rash [Coordination Grossly Intact] : coordination grossly intact [No Focal Deficits] : no focal deficits [Normal Gait] : normal gait [Normal Affect] : the affect was normal [Normal Insight/Judgement] : insight and judgment were intact [No Carotid Bruits] : no carotid bruits [de-identified] : + dry skin LLE  [de-identified] : + LLE chronic  edema

## 2025-06-15 NOTE — PLAN
[FreeTextEntry1] : Hospital Follow up   Cellulitis s/p sepsis, Hospitalization  Hospital records reviewed and discussed with patient  b/l LE edema, chronic cont. compression socks, walking, and leg exercises  Pt advised avoid excess fluid intake  Venous doppler negative for DVT follows with vascular  Dizziness  cont. Meclizine HCl 25mg daily PRN   Breast cancer s/p lumpectomy  follows with oncology   Afib / CHF started on  bumetanide 2mg QD cont. metoprolol succinate 25mg QD -- renewed today on Xarelto 20mg QD follow with cardiology   hx of Depression cont Duloxetine 60mg QD  hx of hypothyroidism cont Levothyroxine 75mcg QD we'll check TSH/T4 today  DM II cont. Metformin 850mg BID  Reinforced the importance of following a low carb/low sugar diet. We'll check glucose and HbA1c today  HLD cont. Rosuvastatin 10mg QHS Reinforced the importance of following a low cholesterol/low fat diet. we'll check lipids and LFT's today   hx of overactive bladder cont oxybutynin chloride 5mg QD  Bloodwork ordered.  Follow up in 2 weeks for lab results.

## 2025-06-15 NOTE — HISTORY OF PRESENT ILLNESS
[de-identified] : Ms. JOVI BUCKLEY is an 85 year old female with Hx of A.fib, DMII, HLD, overactive bladder and hypothyroidism, chronic b/l LE edema, Breast cancer s/p lumpectomy 1/24/25, presenting for a post hospitalization follow up. She is accompanied by her son and ambulating with a walker.  Pt was admitted to [facility] 5/21/25-5/30/25 for septic shock from LLE cellulitis and found to have strep bacteremia s/p MICU.  She received Vancomycin and Zosyn. CTA Chest showed concern for pulmonary edema and the patient received 40mg Lasix. Patient also got clindamycin. She was treated for GroupA strep bacteremia from cellulitis lower extremities  and was discharged on midline for IV antibiotics through 6/4.  Pt finished 13 day course of antibiotics and was started on metoprolol succinate 25mg QD and bumetanide 2mg QD for CHF Pt states she is walking regularly and hydrating herself. She has lost 40 lbs., is being visited by a nurse at home and is in Physical Therapy.  She has been using Udderly Smooth cream on her LLE for dryness.  Denies any CP, SOB, or abdominal pain.

## 2025-06-15 NOTE — PHYSICAL EXAM
[No Acute Distress] : no acute distress [Well Nourished] : well nourished [Well Developed] : well developed [Well-Appearing] : well-appearing [Normal Sclera/Conjunctiva] : normal sclera/conjunctiva [Normal Outer Ear/Nose] : the outer ears and nose were normal in appearance [Normal Oropharynx] : the oropharynx was normal [No JVD] : no jugular venous distention [No Lymphadenopathy] : no lymphadenopathy [Supple] : supple [No Respiratory Distress] : no respiratory distress  [No Accessory Muscle Use] : no accessory muscle use [Clear to Auscultation] : lungs were clear to auscultation bilaterally [Normal Rate] : normal rate  [Regular Rhythm] : with a regular rhythm [Normal S1, S2] : normal S1 and S2 [Pedal Pulses Present] : the pedal pulses are present [No Extremity Clubbing/Cyanosis] : no extremity clubbing/cyanosis [Soft] : abdomen soft [Non Tender] : non-tender [Non-distended] : non-distended [Normal Posterior Cervical Nodes] : no posterior cervical lymphadenopathy [Normal Anterior Cervical Nodes] : no anterior cervical lymphadenopathy [No CVA Tenderness] : no CVA  tenderness [No Spinal Tenderness] : no spinal tenderness [No Joint Swelling] : no joint swelling [Grossly Normal Strength/Tone] : grossly normal strength/tone [No Rash] : no rash [Coordination Grossly Intact] : coordination grossly intact [No Focal Deficits] : no focal deficits [Normal Gait] : normal gait [Normal Affect] : the affect was normal [Normal Insight/Judgement] : insight and judgment were intact [No Carotid Bruits] : no carotid bruits [de-identified] : + LLE chronic  edema  [de-identified] : + dry skin LLE

## 2025-06-15 NOTE — ADDENDUM
[FreeTextEntry1] : Documented by Casandra Cornelius acting as a scribe for Dr. Hood. 06/13/2025   All medical record entries made by the scribe were at my, Dr. Hood, direction and personally dictated by me on 06/13/2025. I have reviewed the chart an agree that the record accurately reflects my personal performance of the history, physical exam, assessment and plan. I have also personally directed, reviewed, and agreed with the chart.

## 2025-06-15 NOTE — HISTORY OF PRESENT ILLNESS
[de-identified] : Ms. JOVI BUCKLEY is an 85 year old female with Hx of A.fib, DMII, HLD, overactive bladder and hypothyroidism, chronic b/l LE edema, Breast cancer s/p lumpectomy 1/24/25, presenting for a post hospitalization follow up. She is accompanied by her son and ambulating with a walker.  Pt was admitted to [facility] 5/21/25-5/30/25 for septic shock from LLE cellulitis and found to have strep bacteremia s/p MICU.  She received Vancomycin and Zosyn. CTA Chest showed concern for pulmonary edema and the patient received 40mg Lasix. Patient also got clindamycin. She was treated for GroupA strep bacteremia from cellulitis lower extremities  and was discharged on midline for IV antibiotics through 6/4.  Pt finished 13 day course of antibiotics and was started on metoprolol succinate 25mg QD and bumetanide 2mg QD for CHF Pt states she is walking regularly and hydrating herself. She has lost 40 lbs., is being visited by a nurse at home and is in Physical Therapy.  She has been using Udderly Smooth cream on her LLE for dryness.  Denies any CP, SOB, or abdominal pain.

## 2025-07-30 NOTE — HISTORY OF PRESENT ILLNESS
[Family Member] : family member [Other Location: e.g. School (Enter Location, City,State)___] : at [unfilled], at the time of the visit. [Other Location: e.g. Home (Enter Location, City,State)___] : at [unfilled] [Telehealth (audio & video)] : This visit was provided via telehealth using real-time 2-way audio visual technology. [de-identified] : This is a 74-year-old woman with history of carcinoma of breast returns for followup. The patient is doing well with no evidence of recurrent disease. She has been on Arimidex treatment and will be completing 4 years of therapy. In 2015 Pt had left lumpectomy 10 yrs ago followed by RT and Arimidex for 5 yrs. Pt has had no recurrence of her disease,  Pt had mammo 9/22 and has lesion in right breast and bx shows papillary lesion. Pt saw Dr. Manley and will have lumpectomy.  The patient has developed an new lesion in the right breast in December 2024 and underwent surgical consultation.  He underwent biopsy and returns today to discuss results.  For the past week however the patient has had bleeding from the wound and comes in for immediate evaluation to stop the bleeding from the breast wound. [de-identified] : Since the last visit the patient states she is feeling better and able to do more activities. PT eval   DC planning if stable in am

## 2025-07-30 NOTE — ASSESSMENT
[Curative] : Goals of care discussed with patient: Curative [FreeTextEntry1] : The patient continues to do well and is improving since her recent hospitalization for heart failure.  She continues to lose weight and takes diuretics.  She continues on cardiology evaluation.  She currently has no signs or symptoms indicating recurrence of her breast cancer.  We discussed restarting her exemestane 1 pill daily and if she has symptoms we will recommend going to 1 pill every other day.  We will renew her prescription to the pharmacy to [facility].  She will return to the office in 1 month or sooner as needed.

## 2025-07-30 NOTE — REASON FOR VISIT
[Follow-Up Visit] : a follow-up [Family Member] : family member [Home] : at home, [unfilled] , at the time of the visit. [Other Location: e.g. Home (Enter Location, City,State)___] : at [unfilled] [Telehealth (audio & video)] : This visit was provided via telehealth using real-time 2-way audio visual technology. [Verbal consent obtained from patient] : the patient, [unfilled] [FreeTextEntry2] : ca breast